# Patient Record
Sex: MALE | Race: WHITE
[De-identification: names, ages, dates, MRNs, and addresses within clinical notes are randomized per-mention and may not be internally consistent; named-entity substitution may affect disease eponyms.]

---

## 2022-09-15 ENCOUNTER — HOSPITAL ENCOUNTER (OUTPATIENT)
Dept: HOSPITAL 47 - RADUSWWP | Age: 22
Discharge: HOME | End: 2022-09-15
Attending: INTERNAL MEDICINE
Payer: COMMERCIAL

## 2022-09-15 DIAGNOSIS — R31.9: ICD-10-CM

## 2022-09-15 DIAGNOSIS — Q99.8: Primary | ICD-10-CM

## 2022-09-15 PROCEDURE — 76770 US EXAM ABDO BACK WALL COMP: CPT

## 2022-09-15 PROCEDURE — 86161 COMPLEMENT/FUNCTION ACTIVITY: CPT

## 2022-09-15 NOTE — US
EXAMINATION TYPE: US kidneys/renal and bladder

 

DATE OF EXAM: 9/15/2022

 

COMPARISON: NONE

 

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 Hematuria. Lower back pain. 

 

EXAM MEASUREMENTS:

 

Right Kidney:  10.9 x 5.3 x 4.5 cm

Left Kidney: 11.5 x 5.4 x 4.8 cm

 

 

 

Right Kidney: No hydronephrosis or masses seen  

Left Kidney: No hydronephrosis or masses seen  

Bladder: Internal debris seen within the bladder.

**Bilateral Jets seen: Yes

Bladder not completely anechoic.

 

IMPRESSION: Correlate for debris or pus within bladder suspicious for infection or cystitis.